# Patient Record
Sex: FEMALE | Race: WHITE | NOT HISPANIC OR LATINO | URBAN - METROPOLITAN AREA
[De-identification: names, ages, dates, MRNs, and addresses within clinical notes are randomized per-mention and may not be internally consistent; named-entity substitution may affect disease eponyms.]

---

## 2019-12-27 ENCOUNTER — OFFICE (OUTPATIENT)
Dept: URBAN - METROPOLITAN AREA CLINIC 19 | Facility: CLINIC | Age: 24
End: 2019-12-27

## 2019-12-27 VITALS
HEIGHT: 64 IN | SYSTOLIC BLOOD PRESSURE: 130 MMHG | HEART RATE: 63 BPM | DIASTOLIC BLOOD PRESSURE: 72 MMHG | WEIGHT: 122 LBS

## 2019-12-27 DIAGNOSIS — R10.10 UPPER ABDOMINAL PAIN, UNSPECIFIED: ICD-10-CM

## 2019-12-27 DIAGNOSIS — R19.7 DIARRHEA, UNSPECIFIED: ICD-10-CM

## 2019-12-27 LAB
IMMUNOGLOBULIN A, QN, SERUM: 86 MG/DL — LOW (ref 87–352)
SEDIMENTATION RATE-WESTERGREN: 2 MM/HR (ref 0–32)
T-TRANSGLUTAMINASE (TTG) IGA: <2 U/ML
TSH: 0.93 UIU/ML (ref 0.45–4.5)

## 2019-12-27 PROCEDURE — 99203 OFFICE O/P NEW LOW 30 MIN: CPT | Performed by: INTERNAL MEDICINE

## 2019-12-27 NOTE — SERVICEHPINOTES
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  Patient reports the pain was sharp, and she had a crouch over to obtain some relief.  Standing worsened the pain. Patient does not report any association with any specific foods.   Patient was in [place] when the symptoms began, and she went to a local ER.  She reports they did labs that were normal, and an abdominal x-ray was also within normal limits.  Since then, patient's symptoms have improved.  She does report baseline fecal urgency after eating, but feels this has also worsened over the past week.  Patient does not think the fecal urgency is worsened by dairy products, or by any gluten containing food products.  Patient is also on her cycle, but reports that the recent symptoms she experienced are very different than menstrual period related  cramping.  Patient reports no sick contacts, no recent travel.  No recent antibiotic use.  Patient has not had any nausea, vomiting, or GI bleeding.   No fevers, chills, chest pain, shortness of breath.  Patient has a brother  and grandmother who needed to have a cholecystectomy.

## 2020-01-07 ENCOUNTER — OFFICE (OUTPATIENT)
Dept: URBAN - METROPOLITAN AREA CLINIC 19 | Facility: CLINIC | Age: 25
End: 2020-01-07

## 2020-01-07 LAB
CALPROTECTIN, FECAL: <16 UG/G
RESULT: RESULT 1: (no result)
RESULT: RESULT 1: (no result)
STOOL CULTURE: CAMPYLOBACTER CULTURE: (no result)
STOOL CULTURE: E COLI SHIGA TOXIN EIA: NEGATIVE
STOOL CULTURE: SALMONELLA/SHIGELLA SCREEN: (no result)

## 2020-01-21 ENCOUNTER — OFFICE (OUTPATIENT)
Dept: URBAN - METROPOLITAN AREA CLINIC 19 | Facility: CLINIC | Age: 25
End: 2020-01-21

## 2020-01-21 DIAGNOSIS — R10.10 UPPER ABDOMINAL PAIN, UNSPECIFIED: ICD-10-CM

## 2020-01-21 PROCEDURE — 76705 ECHO EXAM OF ABDOMEN: CPT | Performed by: INTERNAL MEDICINE
